# Patient Record
Sex: MALE | Race: OTHER
[De-identification: names, ages, dates, MRNs, and addresses within clinical notes are randomized per-mention and may not be internally consistent; named-entity substitution may affect disease eponyms.]

---

## 2022-12-24 ENCOUNTER — HOSPITAL ENCOUNTER (INPATIENT)
Dept: HOSPITAL 93 - ER | Age: 57
LOS: 4 days | Discharge: ANOTHER HOSPITAL | DRG: 445 | End: 2022-12-28
Attending: INTERNAL MEDICINE | Admitting: INTERNAL MEDICINE
Payer: COMMERCIAL

## 2022-12-24 VITALS — HEIGHT: 71 IN | BODY MASS INDEX: 25.9 KG/M2 | WEIGHT: 185 LBS

## 2022-12-24 DIAGNOSIS — Z20.822: ICD-10-CM

## 2022-12-24 DIAGNOSIS — K29.60: ICD-10-CM

## 2022-12-24 DIAGNOSIS — K80.51: Primary | ICD-10-CM

## 2022-12-24 DIAGNOSIS — K80.43: ICD-10-CM

## 2022-12-24 DIAGNOSIS — R17: ICD-10-CM

## 2022-12-24 PROCEDURE — BW40ZZZ ULTRASONOGRAPHY OF ABDOMEN: ICD-10-PCS | Performed by: GENERAL PRACTICE

## 2022-12-24 NOTE — NUR
PACIENTE SE LE REALIZA ADMINISTRACION DE MEDICAMENTOS POR ORDEN MEDICA, SE
REALIZAN MUESTRAS DE QUIANA Y SE MANTIENE PENDIENTE DE RESULTADOS.

## 2022-12-24 NOTE — NUR
SE RECEIB PTE ALERTA Y ORIEBNTADO X3 CUAL REFIERE VOMITOS, DIUARREAS, PICOR EN
EL CUERPO Y OJOS/ NIMCO COLOR AMARILLO. SE GABRIELLE S/V Y SE UBICA.

## 2022-12-25 PROCEDURE — BF37ZZZ MAGNETIC RESONANCE IMAGING (MRI) OF PANCREAS: ICD-10-PCS

## 2022-12-25 PROCEDURE — BW21Y0Z COMPUTERIZED TOMOGRAPHY (CT SCAN) OF ABDOMEN AND PELVIS USING OTHER CONTRAST, UNENHANCED AND ENHANCED: ICD-10-PCS

## 2023-03-31 ENCOUNTER — HOSPITAL ENCOUNTER (INPATIENT)
Dept: HOSPITAL 93 - ER | Age: 58
LOS: 9 days | DRG: 435 | End: 2023-04-09
Attending: INTERNAL MEDICINE | Admitting: INTERNAL MEDICINE
Payer: COMMERCIAL

## 2023-03-31 VITALS — HEIGHT: 60 IN | WEIGHT: 179 LBS | BODY MASS INDEX: 35.14 KG/M2

## 2023-03-31 DIAGNOSIS — G93.49: ICD-10-CM

## 2023-03-31 DIAGNOSIS — C22.1: Primary | ICD-10-CM

## 2023-03-31 DIAGNOSIS — E87.6: ICD-10-CM

## 2023-03-31 DIAGNOSIS — J90: ICD-10-CM

## 2023-03-31 DIAGNOSIS — Z20.822: ICD-10-CM

## 2023-03-31 DIAGNOSIS — K65.1: ICD-10-CM

## 2023-03-31 DIAGNOSIS — A41.02: ICD-10-CM

## 2023-03-31 DIAGNOSIS — B95.62: ICD-10-CM

## 2023-03-31 DIAGNOSIS — N17.9: ICD-10-CM

## 2023-03-31 DIAGNOSIS — D63.0: ICD-10-CM

## 2023-03-31 DIAGNOSIS — C78.6: ICD-10-CM

## 2023-03-31 DIAGNOSIS — R18.8: ICD-10-CM

## 2023-03-31 DIAGNOSIS — E87.1: ICD-10-CM

## 2023-03-31 PROCEDURE — BW24ZZZ COMPUTERIZED TOMOGRAPHY (CT SCAN) OF CHEST AND ABDOMEN: ICD-10-PCS | Performed by: EMERGENCY MEDICINE

## 2023-03-31 PROCEDURE — BW21ZZZ COMPUTERIZED TOMOGRAPHY (CT SCAN) OF ABDOMEN AND PELVIS: ICD-10-PCS | Performed by: EMERGENCY MEDICINE

## 2023-04-01 PROCEDURE — 4A12X4Z MONITORING OF CARDIAC ELECTRICAL ACTIVITY, EXTERNAL APPROACH: ICD-10-PCS | Performed by: INTERNAL MEDICINE

## 2023-04-02 PROCEDURE — 5A1D70Z PERFORMANCE OF URINARY FILTRATION, INTERMITTENT, LESS THAN 6 HOURS PER DAY: ICD-10-PCS | Performed by: SPECIALIST/TECHNOLOGIST, OTHER

## 2023-04-02 PROCEDURE — 30233N1 TRANSFUSION OF NONAUTOLOGOUS RED BLOOD CELLS INTO PERIPHERAL VEIN, PERCUTANEOUS APPROACH: ICD-10-PCS | Performed by: INTERNAL MEDICINE

## 2023-04-03 PROCEDURE — 0W9G30Z DRAINAGE OF PERITONEAL CAVITY WITH DRAINAGE DEVICE, PERCUTANEOUS APPROACH: ICD-10-PCS | Performed by: RADIOLOGY

## 2023-04-04 PROCEDURE — 0W9B3ZZ DRAINAGE OF LEFT PLEURAL CAVITY, PERCUTANEOUS APPROACH: ICD-10-PCS | Performed by: RADIOLOGY

## 2023-04-04 PROCEDURE — 5A1D70Z PERFORMANCE OF URINARY FILTRATION, INTERMITTENT, LESS THAN 6 HOURS PER DAY: ICD-10-PCS | Performed by: SPECIALIST/TECHNOLOGIST, OTHER

## 2023-04-05 PROCEDURE — B24BZZZ ULTRASONOGRAPHY OF HEART WITH AORTA: ICD-10-PCS | Performed by: INTERNAL MEDICINE

## 2023-04-05 PROCEDURE — 0W993ZZ DRAINAGE OF RIGHT PLEURAL CAVITY, PERCUTANEOUS APPROACH: ICD-10-PCS | Performed by: RADIOLOGY

## 2023-04-06 PROCEDURE — 5A1D70Z PERFORMANCE OF URINARY FILTRATION, INTERMITTENT, LESS THAN 6 HOURS PER DAY: ICD-10-PCS | Performed by: SPECIALIST/TECHNOLOGIST, OTHER

## 2023-04-08 PROCEDURE — 5A1D70Z PERFORMANCE OF URINARY FILTRATION, INTERMITTENT, LESS THAN 6 HOURS PER DAY: ICD-10-PCS | Performed by: SPECIALIST/TECHNOLOGIST, OTHER
